# Patient Record
Sex: MALE | Race: WHITE | NOT HISPANIC OR LATINO | URBAN - METROPOLITAN AREA
[De-identification: names, ages, dates, MRNs, and addresses within clinical notes are randomized per-mention and may not be internally consistent; named-entity substitution may affect disease eponyms.]

---

## 2017-11-03 ENCOUNTER — CLINICAL SUPPORT (OUTPATIENT)
Dept: FAMILY MEDICINE | Facility: CLINIC | Age: 37
End: 2017-11-03

## 2017-11-03 DIAGNOSIS — Z00.00 ROUTINE GENERAL MEDICAL EXAMINATION AT A HEALTH CARE FACILITY: ICD-10-CM

## 2017-11-03 PROCEDURE — 80103 PR  DRUG ANALYSIS, TISSUE PREP: CPT | Mod: S$GLB,,, | Performed by: FAMILY MEDICINE

## 2017-11-03 NOTE — PROGRESS NOTES
Yovany has presented today on behalf of [employer]. Yovany Reyes has completed Hair Drug Screen .    For Day.    Total charge is 25.00    Vicki Mosley